# Patient Record
Sex: FEMALE | Race: BLACK OR AFRICAN AMERICAN | NOT HISPANIC OR LATINO | Employment: FULL TIME | URBAN - METROPOLITAN AREA
[De-identification: names, ages, dates, MRNs, and addresses within clinical notes are randomized per-mention and may not be internally consistent; named-entity substitution may affect disease eponyms.]

---

## 2019-06-11 ENCOUNTER — TELEPHONE (OUTPATIENT)
Dept: PHARMACY | Facility: CLINIC | Age: 54
End: 2019-06-11

## 2019-06-11 NOTE — TELEPHONE ENCOUNTER
LVM for callback to inform patient that [facility] received prescription for Mavyret and prior authorization is required.  [facility] will be back in touch once insurance determination is received.

## 2019-06-12 NOTE — TELEPHONE ENCOUNTER
Documentation Only:  Faxed prior authorization for Mavyret to insurance company for review on 06.12.2019 as URGENT [id number]

## 2019-08-15 ENCOUNTER — TELEPHONE (OUTPATIENT)
Dept: PHARMACY | Facility: CLINIC | Age: 54
End: 2019-08-15

## 2019-09-11 ENCOUNTER — TELEPHONE (OUTPATIENT)
Dept: PHARMACY | Facility: CLINIC | Age: 54
End: 2019-09-11

## 2019-09-11 PROBLEM — A63.0 HPV (HUMAN PAPILLOMA VIRUS) ANOGENITAL INFECTION: Status: ACTIVE | Noted: 2019-09-11

## 2019-09-11 PROBLEM — E78.5 HLD (HYPERLIPIDEMIA): Status: ACTIVE | Noted: 2019-09-11

## 2019-09-11 NOTE — TELEPHONE ENCOUNTER
Patient will be dispensed authorized generic of Epclusa.  All references to Epclusa will be for the authorized generic.    Initial Epclusa consult completed on . Epclusa will be shipped on  to arrive at patient's home on  via [employer]. $0.00 copay. Patient will start Epclusa on . Address confirmed, CC on file. Confirmed 2 patient identifiers - name and . Therapy Appropriate.     Epclusa 400/100mg- Take one tablet by mouth daily x 12 weeks  Counseling was reviewed:   1. Patient MUST take Epclusa at the SAME time every day.   2. Patient MUST avoid acid reducers without consulting with myself or provider first. Antacids are to be spaced out at least 4 hours apart from Epclusa.  Famotidine is to be taken AT THE SAME TIME as Epclusa.    3. Potential Side effects include: nausea, headaches, insomnia and fatigue.   Headache: Patient may treat with OTC remedies. If Tylenol is used, dose should not exceed 2000mg per day.    4. Medication list reviewed. No DDIs or allergies noted. Patient MUST contact myself or provider prior to starting any new OTC, herbal, or prescription drugs to avoid potential DDIs.    DDI: Medication list reviewed and potential DDIs addressed.  Patient aware to hold Lovastatin and Omeprazole while on Epclusa.  Patient will do trial of Famotidine to be dosed at the same time as Epclusa.    Discussed the importance of staying well hydrated while on therapy. Compliance stressed - patient to take missed doses as soon as remembered, but NOT to take 2 doses in one day. Patient will report questions or concerns to myself or practitioner. Patient verbalizes understanding. Patient plans to start Epclusa on .  Consultation included: indication; goals of treatment; administration; storage and handling; side effects; how to handle side effects; the importance of compliance; how to handle missed doses; the importance of laboratory monitoring; the importance of keeping all follow up appointments.   Patient understands to report any medication changes to [facility] and provider. All questions answered and addressed to patients satisfaction. I will f/u with her in 1 week from start, [facility] to contact patient in 3 weeks for refills.

## 2019-09-20 ENCOUNTER — NURSE TRIAGE (OUTPATIENT)
Dept: ADMINISTRATIVE | Facility: CLINIC | Age: 54
End: 2019-09-20

## 2019-09-20 NOTE — TELEPHONE ENCOUNTER
Reason for Disposition   Caller has NON-URGENT medication question about med that PCP prescribed and triager unable to answer question    Protocols used: MEDICATION QUESTION CALL-A-AH    Patient cannot take vitamin E and omeprazole with the sofosbuvir-velpatasvir 400-100 mg Tab. She states she cannot take zantac and pepsid because they cause nausea and vomiting. Patient informed a message would be sent to her provider that she has to change antacid. Patient states she will wait to hear back.

## 2019-10-04 ENCOUNTER — TELEPHONE (OUTPATIENT)
Dept: PHARMACY | Facility: CLINIC | Age: 54
End: 2019-10-04

## 2019-10-04 NOTE — TELEPHONE ENCOUNTER
Epclusa refill and follow-up attempted. Unable to process prescription due to coverage termination. Spoke with patient who confirms insurance is on hold until she sends in her check stubs. Patient stated she was on the way to get them and send them while we were on the phone. Patient reports she has 9 doses of Epclusa remaining, but she also reported having 9 doses on 10/2. Will follow-up on Monday.

## 2019-10-09 NOTE — TELEPHONE ENCOUNTER
AG Epclusa (2 of 3)  refill confirmed. We will ship AG Epclusa refill on 10/9 via [employer] to arrive on 10/10. $0.00 copay- 004. Confirmed 2 patient identifiers - name and .     Patient has 2 doses of AG Epclusa remaining and takes it around 4:30 AM daily.  Pt reports they are not having any side effects so far. No missed doses, no new medications, no new allergies or health conditions reported at this time. Reviewed the importance of taking Pepcid AT THE SAME time as AG Epclusa. Tums, if needed for heart burn, must be  by 4 hours from AG Epclusa. Patient is continuing lovastatin; appropriate with NO DDI with AG Epclusa. All questions answered and addressed to patients satisfaction. Pt verbalized understanding.

## 2019-10-15 PROBLEM — A63.0 HPV (HUMAN PAPILLOMA VIRUS) ANOGENITAL INFECTION: Status: RESOLVED | Noted: 2019-09-11 | Resolved: 2019-10-15

## 2019-11-01 ENCOUNTER — TELEPHONE (OUTPATIENT)
Dept: PHARMACY | Facility: CLINIC | Age: 54
End: 2019-11-01

## 2019-11-01 NOTE — TELEPHONE ENCOUNTER
Epclusa refill (3 of 3) confirmed. We will ship Epclusa refill on  via [employer] to arrive on . $0 copay- 004. Confirmed 2 patient identifiers - name and .     Patient has 7 doses of Epclusa remaining and takes it around 4:30 am daily.  Pt reports they are not having any side effects so far. No missed doses, no new medications, no new allergies or health conditions reported at this time. All questions answered and addressed to patients satisfaction. Pt verbalized understanding.

## 2020-05-15 PROBLEM — M79.10 MUSCLE SORENESS: Status: ACTIVE | Noted: 2020-05-15

## 2021-09-17 PROBLEM — E78.49 OTHER HYPERLIPIDEMIA: Status: ACTIVE | Noted: 2019-09-11

## 2021-09-17 PROBLEM — K21.9 GASTROESOPHAGEAL REFLUX DISEASE WITHOUT ESOPHAGITIS: Status: ACTIVE | Noted: 2021-09-17

## 2021-09-17 PROBLEM — R05.8 DRY COUGH: Status: ACTIVE | Noted: 2021-09-17

## 2021-09-17 PROBLEM — R73.03 PRE-DIABETES: Status: ACTIVE | Noted: 2021-09-17

## 2021-11-24 PROBLEM — N89.0 VAIN I (VAGINAL INTRAEPITHELIAL NEOPLASIA GRADE I): Status: ACTIVE | Noted: 2021-11-24

## 2021-11-24 PROBLEM — Z98.890 STATUS POST LEEP (LOOP ELECTROSURGICAL EXCISION PROCEDURE) OF CERVIX: Status: ACTIVE | Noted: 2021-11-24

## 2022-03-04 PROBLEM — M79.10 MUSCLE SORENESS: Status: RESOLVED | Noted: 2020-05-15 | Resolved: 2022-03-04

## 2022-03-04 PROBLEM — R05.8 DRY COUGH: Status: RESOLVED | Noted: 2021-09-17 | Resolved: 2022-03-04

## 2024-09-27 DIAGNOSIS — Z12.31 OTHER SCREENING MAMMOGRAM: ICD-10-CM

## 2024-09-30 ENCOUNTER — PATIENT MESSAGE (OUTPATIENT)
Dept: ADMINISTRATIVE | Facility: HOSPITAL | Age: 59
End: 2024-09-30